# Patient Record
Sex: MALE | Race: WHITE | NOT HISPANIC OR LATINO | Employment: FULL TIME | URBAN - METROPOLITAN AREA
[De-identification: names, ages, dates, MRNs, and addresses within clinical notes are randomized per-mention and may not be internally consistent; named-entity substitution may affect disease eponyms.]

---

## 2024-07-13 ENCOUNTER — HOSPITAL ENCOUNTER (EMERGENCY)
Facility: CLINIC | Age: 49
Discharge: HOME | End: 2024-07-13
Attending: EMERGENCY MEDICINE | Admitting: EMERGENCY MEDICINE
Payer: COMMERCIAL

## 2024-07-13 ENCOUNTER — APPOINTMENT (OUTPATIENT)
Dept: GENERAL RADIOLOGY | Facility: CLINIC | Age: 49
End: 2024-07-13
Attending: EMERGENCY MEDICINE
Payer: COMMERCIAL

## 2024-07-13 VITALS
SYSTOLIC BLOOD PRESSURE: 115 MMHG | DIASTOLIC BLOOD PRESSURE: 71 MMHG | OXYGEN SATURATION: 99 % | HEIGHT: 67 IN | HEART RATE: 73 BPM | TEMPERATURE: 98.2 F | RESPIRATION RATE: 18 BRPM

## 2024-07-13 DIAGNOSIS — S82.891A CLOSED FRACTURE OF RIGHT ANKLE, INITIAL ENCOUNTER: ICD-10-CM

## 2024-07-13 PROCEDURE — 99284 EMERGENCY DEPT VISIT MOD MDM: CPT | Mod: 25 | Performed by: EMERGENCY MEDICINE

## 2024-07-13 PROCEDURE — 73600 X-RAY EXAM OF ANKLE: CPT | Mod: 26 | Performed by: RADIOLOGY

## 2024-07-13 PROCEDURE — 27786 TREATMENT OF ANKLE FRACTURE: CPT | Mod: RT | Performed by: EMERGENCY MEDICINE

## 2024-07-13 PROCEDURE — 250N000013 HC RX MED GY IP 250 OP 250 PS 637: Performed by: EMERGENCY MEDICINE

## 2024-07-13 PROCEDURE — 73600 X-RAY EXAM OF ANKLE: CPT | Mod: RT,52

## 2024-07-13 PROCEDURE — 73560 X-RAY EXAM OF KNEE 1 OR 2: CPT | Mod: 26 | Performed by: RADIOLOGY

## 2024-07-13 PROCEDURE — 99284 EMERGENCY DEPT VISIT MOD MDM: CPT | Mod: 57 | Performed by: EMERGENCY MEDICINE

## 2024-07-13 PROCEDURE — 73610 X-RAY EXAM OF ANKLE: CPT | Mod: 26 | Performed by: RADIOLOGY

## 2024-07-13 PROCEDURE — 73560 X-RAY EXAM OF KNEE 1 OR 2: CPT | Mod: RT

## 2024-07-13 PROCEDURE — 27786 TREATMENT OF ANKLE FRACTURE: CPT | Mod: 54 | Performed by: EMERGENCY MEDICINE

## 2024-07-13 PROCEDURE — 73610 X-RAY EXAM OF ANKLE: CPT | Mod: RT

## 2024-07-13 RX ORDER — ACETAMINOPHEN 325 MG/1
975 TABLET ORAL ONCE
Status: COMPLETED | OUTPATIENT
Start: 2024-07-13 | End: 2024-07-13

## 2024-07-13 RX ORDER — IBUPROFEN 800 MG/1
800 TABLET, FILM COATED ORAL ONCE
Status: COMPLETED | OUTPATIENT
Start: 2024-07-13 | End: 2024-07-13

## 2024-07-13 RX ORDER — BUPROPION HYDROCHLORIDE 200 MG/1
300 TABLET, EXTENDED RELEASE ORAL DAILY
COMMUNITY

## 2024-07-13 RX ADMIN — IBUPROFEN 800 MG: 800 TABLET, FILM COATED ORAL at 09:18

## 2024-07-13 RX ADMIN — ACETAMINOPHEN 975 MG: 325 TABLET, FILM COATED ORAL at 08:56

## 2024-07-13 ASSESSMENT — COLUMBIA-SUICIDE SEVERITY RATING SCALE - C-SSRS
6. HAVE YOU EVER DONE ANYTHING, STARTED TO DO ANYTHING, OR PREPARED TO DO ANYTHING TO END YOUR LIFE?: NO
1. IN THE PAST MONTH, HAVE YOU WISHED YOU WERE DEAD OR WISHED YOU COULD GO TO SLEEP AND NOT WAKE UP?: NO
2. HAVE YOU ACTUALLY HAD ANY THOUGHTS OF KILLING YOURSELF IN THE PAST MONTH?: NO

## 2024-07-13 ASSESSMENT — ACTIVITIES OF DAILY LIVING (ADL)
ADLS_ACUITY_SCORE: 35

## 2024-07-13 NOTE — DISCHARGE INSTRUCTIONS
For pain, please take 975-1000mg acetaminophen (tylenol) every 8 hours -- do not take more than 3000mg in a 24 hour period.    You can also take 600mg ibuprofen (motrin/advil) every 6 hours for pain  Use crutches and Cam boot for affected ankle and weightbear as tolerated, until you are seen by orthopedics (either in the [facility] or back in your home city)

## 2024-07-13 NOTE — ED PROVIDER NOTES
Orthopedic surgery brief note:    Call received from emergency department regarding patient and new right minimally displaced distal fibular fracture.  X-rays reviewed patient does have a Phan a fracture of the right ankle.  No medial sided tenderness reported by the emergency department.  Stress views of the x-ray did not show any widening of the syndesmosis.  Patient discussed with senior resident.  Plan would be for weightbearing as tolerated in a cam boot and follow-up as an outpatient.  This was expressed to the emergency department and they agree with this plan.  Patient was not seen by orthopedic surgery in the emergency department.    Thank you,    Espinoza Hernandez MD   [id number]  Department of Orthopaedic Surgery  Pager [phone]

## 2024-07-13 NOTE — ED TRIAGE NOTES
Pt coming in with R ankle injury/pain. Pt was on a run this morning when he twisted ankle on a curb. Denies hitting head.      Triage Assessment (Adult)       Row Name 07/13/24 0846          Triage Assessment    Airway WDL WDL        Respiratory WDL    Respiratory WDL WDL        Cardiac WDL    Cardiac WDL WDL        Peripheral/Neurovascular WDL    Peripheral Neurovascular WDL WDL        Cognitive/Neuro/Behavioral WDL    Cognitive/Neuro/Behavioral WDL WDL

## 2024-07-13 NOTE — ED PROVIDER NOTES
"ED Provider Note  [facility]      History     Chief Complaint   Patient presents with    Ankle Pain     The history is provided by medical records and the patient.     Moreno Tiwari is a 49 year old male who was running and had a right ankle inversion injury while stepping off a sloping curve. Patient denies significant knee pain. He took extra strength Tylenol prior to arrival. Reports moderate right ankle pain, mostly over the swelling on the lateral side.    This part of the medical record was transcribed by Julieta Schulte Medical Scribe, from a dictation done by Krishan Perez MD.     Physical Exam   BP: 115/71  Pulse: 73  Temp: 98.2  F (36.8  C)  Resp: 18  Height: 170.2 cm (5' 7\")  SpO2: 99 %  Physical Exam  Alert, awake, no acute distress     Right lower extremity exam:   right knee nontender on patella or on lateral medial sides  no midfoot or toe tenderness   significant swelling of the right lateral anterior ankle with tenderness to palpation   minimal medial malleoli or tenderness   DP pulse strong and intact   sensation and strength intact distally in toes     ED Course, Procedures, & Data      Procedures              Results for orders placed or performed during the hospital encounter of 07/13/24   Ankle XR, G/E 3 views, right     Status: None    Narrative    EXAM: XR ANKLE RIGHT G/E 3 VIEWS, XR ANKLE RIGHT 1 VIEW  LOCATION: [facility]  DATE: 7/13/2024    INDICATION: Ankle pain  COMPARISON: None.      Impression    IMPRESSION: There is a transverse fracture across the lateral malleolus essentially at the level of the talar dome. There is no significant asymmetry of the ankle mortise, including on stress view. There is soft tissue swelling over the lateral   malleolus. In addition, there is a chronic fracture or loosened osteochondral lesion along the medial margin of the talar dome. Accessory os trigonum.   XR Knee " Right 1/2 Views     Status: None    Narrative    EXAM: XR KNEE RIGHT 1/2 VIEWS  LOCATION: [facility]  DATE: 7/13/2024    INDICATION: Pain after injury  COMPARISON: None.      Impression    IMPRESSION: The right knee is negative for fracture or compartmental narrowing. No effusion.   XR Ankle Right 1 View     Status: None    Narrative    EXAM: XR ANKLE RIGHT G/E 3 VIEWS, XR ANKLE RIGHT 1 VIEW  LOCATION: [facility]  DATE: 7/13/2024    INDICATION: Ankle pain  COMPARISON: None.      Impression    IMPRESSION: There is a transverse fracture across the lateral malleolus essentially at the level of the talar dome. There is no significant asymmetry of the ankle mortise, including on stress view. There is soft tissue swelling over the lateral   malleolus. In addition, there is a chronic fracture or loosened osteochondral lesion along the medial margin of the talar dome. Accessory os trigonum.     Medications   acetaminophen (TYLENOL) tablet 975 mg (975 mg Oral $Given 7/13/24 0856)   ibuprofen (ADVIL/MOTRIN) tablet 800 mg (800 mg Oral $Given 7/13/24 0918)     Labs Ordered and Resulted from Time of ED Arrival to Time of ED Departure - No data to display  XR Knee Right 1/2 Views   Final Result   IMPRESSION: The right knee is negative for fracture or compartmental narrowing. No effusion.      XR Ankle Right 1 View   Final Result   IMPRESSION: There is a transverse fracture across the lateral malleolus essentially at the level of the talar dome. There is no significant asymmetry of the ankle mortise, including on stress view. There is soft tissue swelling over the lateral    malleolus. In addition, there is a chronic fracture or loosened osteochondral lesion along the medial margin of the talar dome. Accessory os trigonum.      Ankle XR, G/E 3 views, right   Final Result   IMPRESSION: There is a transverse fracture across the lateral  malleolus essentially at the level of the talar dome. There is no significant asymmetry of the ankle mortise, including on stress view. There is soft tissue swelling over the lateral    malleolus. In addition, there is a chronic fracture or loosened osteochondral lesion along the medial margin of the talar dome. Accessory os trigonum.             Critical care was not performed.     Medical Decision Making  The patient's presentation was of moderate complexity (an acute complicated injury).    The patient's evaluation involved:  independent interpretation of testing performed by another health professional (see separate area of note for details)  discussion of management or test interpretation with another health professional (see separate area of note for details)    The patient's management necessitated moderate risk (prescription drug management including medications given in the ED).    Assessment & Plan    Acute right ankle inversion injury with concern for fibular fracture versus ankle sprain. Extra strength ibuprofen as requested by patient for pain. Ankle x-rays with stress view.     9:45 AM reassessment: I have independently reviewed the ankle x-ray images with concern for fibular fracture. I have read discussed with radiology department and will perform stress views as well as knee x-ray to rule out Maisonneuve fracture. Afterwards will discuss with orthopedics for cam boot versus splinting and weightbearing status with outpatient follow-up as patient does not primarily live in the [facility]    This part of the medical record was transcribed by Julieta Schulte, Medical Scribe, from a dictation done by Krishan Perez MD.     I have reviewed the nursing notes. I have reviewed the findings, diagnosis, plan and need for follow up with the patient.    Discharge Medication List as of 7/13/2024 11:44 AM          Final diagnoses:   Closed fracture of right ankle, initial encounter       MD JOSE M Frias  [facility] EMERGENCY DEPARTMENT  7/13/2024     Krishan Perez MD  07/26/24 1055

## 2024-07-13 NOTE — ED NOTES
Walking boot applied, crutch walking instructions given, patient discharged home with instructions

## 2024-07-15 ENCOUNTER — TELEPHONE (OUTPATIENT)
Dept: OTHER | Age: 49
End: 2024-07-15

## 2024-07-15 NOTE — TELEPHONE ENCOUNTER
Orthopedic/Sports Medicine Fracture Triage    Incoming call/or message from call center member.    Fracture type: Ankle.    The patient is in a   Boot .    Date of injury 7/13/24.    Triaged by: Dr. Byrd .    Determined to be managed Non operatively or surgically.    Needs to be seen in 1-2 weeks depending on what patient wants to do.    Additional Comments/information:     Judah Chauhan, ATC

## 2024-07-15 NOTE — TELEPHONE ENCOUNTER
ATC called patient to discuss Dr. Byrd's recommendations.  Patient lives in [place] and is here for a conference.  Dr. Byrd's advise of NWB for 6 weeks vs surgery and WBAT was thoroughly discussed with patient because this was a little different than the ortho consult he received at the hospital.  ATC explained Dr. Byrd is the chief of foot/ankle surgery here and not uncommon to have slightly different guidelines amongst the various physicians of differing experience levels.      Since [place] of state, it was advised that he seek follow up locally.  All information was provided regarding his visit here to [facility] and that he should provide follow up clinic all of our information in order to transfer records promptly.  He was also given medical records # to try and get XR images.    All questions answered at this time.    Judah Chauhan ATC

## 2024-07-15 NOTE — TELEPHONE ENCOUNTER
What is the Concern:  Fracture, in a boot  Date the concern started: Saturday 7/13  Injury related? yes  Is this related to recent surgery?no  Laceration?  No  Body part affected:  R ankle  Has Patient been evaluated for condition? yes  Location the patient was evaluated and treated for the condition?   ED, Location [facility]  Who is referring provider, (name and clinic location) Dr. Krishan Shin at [facility]  What images have been done? X-Ray; Location and City where images were taken: [facility]  Could we send this information to you in [employer] or would you prefer to receive a phone call?:   Patient would prefer a phone call   Okay to leave a detailed message?: Yes at Cell number on file:  Pt needs appt today or tomorrow if possible, he's in town for a conference.  Today and tomorrow his schedule is open.  Wed and Thu would need early AM or evening.      Telephone Information:   Mobile [phone]